# Patient Record
Sex: MALE | Race: OTHER | Employment: OTHER | URBAN - METROPOLITAN AREA
[De-identification: names, ages, dates, MRNs, and addresses within clinical notes are randomized per-mention and may not be internally consistent; named-entity substitution may affect disease eponyms.]

---

## 2017-03-09 NOTE — PATIENT DISCUSSION
(O42.134) Keratoconjunct sicca, not specified as Sjogren's, bilateral - Assesment : Examination revealed Dry Eye Syndrome OU. - Plan : Monitor for changes. Advised patient to call our office with decreased vision or increased symptoms.

## 2017-03-09 NOTE — PATIENT DISCUSSION
(F43.616) Vitreous degeneration, bilateral - Assesment : Examination revealed PVD OU. - Plan : Monitor for changes. Advised patient to call our office with decreased vision or an increase in flashes and/or floaters.

## 2017-03-09 NOTE — PATIENT DISCUSSION
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 MAC OCT  OD - Drusen and atrophic changes/OS - RPE changes - Stable OU. - Plan : Monitor for changes. Advised patient to call our office with decreased vision or increased distortion. Monitor amsler grid and continue AREDs 2 vitamins.  RV 1 year Exam/OCTM

## 2017-03-09 NOTE — PATIENT DISCUSSION
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 Restor OU. - Plan : Monitor for changes. Advised patient to call our office with decreased vision or increased symptoms.

## 2018-03-05 NOTE — PATIENT DISCUSSION
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 MAC OCT  OU - pigment clumping with drusen - Stable OU. - Plan : Monitor for changes. Advised patient to call our office with decreased vision or increased distortion. Monitor amsler grid and continue AREDs 2 vitamins. RV 1 year Exam/MAC OCT.

## 2018-03-05 NOTE — PATIENT DISCUSSION
(M66.000) Keratoconjunct sicca, not specified as Sjogren's, bilateral - Assesment : Examination revealed Dry Eye Syndrome OU. - Plan : Recommend artificial tears 3-4 times daily OU. Monitor for changes. Advised patient to call our office with decreased vision or increased symptoms.

## 2018-03-05 NOTE — PATIENT DISCUSSION
(H26.001) Other secondary cataract, right eye - Assesment : Posterior capsule opacification present. - Plan : Monitor for Changes. Advised patient to call our office with decreased vision or increased symptoms.

## 2018-03-05 NOTE — PATIENT DISCUSSION
(W09.799) Vitreous degeneration, left eye - Assesment : Examination revealed PVD OS. - Plan : Monitor for changes. Advised patient to call our office with decreased vision or an increase in flashes and/or floaters.

## 2019-03-05 NOTE — PATIENT DISCUSSION
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 PED present with fluid. ? Wet ARMD - Plan : refer to retina for evaluation for ?wet ARMD.

## 2019-03-05 NOTE — PATIENT DISCUSSION
(Z25.3880) Nonexudative age-related macular degeneration right eye inte - Assesment : Examination revealed AMD Dry. No edema or hemorrhage seen today - Plan : Monitor for changes. Advised patient to call our office with decreased vision or increased distortion.  1 year exam with DAYANA

## 2019-03-05 NOTE — PATIENT DISCUSSION
(J38.028) Vitreous degeneration, bilateral - Assesment : Examination revealed PVD OU. - Plan : Monitor for changes. Advised patient to call our office with decreased vision or an increase in flashes and/or floaters.

## 2019-03-28 ENCOUNTER — ESTABLISHED COMPREHENSIVE EXAM (OUTPATIENT)
Dept: URBAN - METROPOLITAN AREA CLINIC 39 | Facility: CLINIC | Age: 79
End: 2019-03-28

## 2019-03-28 DIAGNOSIS — H04.123: ICD-10-CM

## 2019-03-28 DIAGNOSIS — H26.492: ICD-10-CM

## 2019-03-28 DIAGNOSIS — H35.371: ICD-10-CM

## 2019-03-28 DIAGNOSIS — H40.013: ICD-10-CM

## 2019-03-28 DIAGNOSIS — H25.811: ICD-10-CM

## 2019-03-28 DIAGNOSIS — H43.813: ICD-10-CM

## 2019-03-28 PROCEDURE — 92015 DETERMINE REFRACTIVE STATE: CPT

## 2019-03-28 PROCEDURE — 92134 CPTRZ OPH DX IMG PST SGM RTA: CPT

## 2019-03-28 PROCEDURE — 92014 COMPRE OPH EXAM EST PT 1/>: CPT

## 2019-03-28 ASSESSMENT — VISUAL ACUITY
OS_SC: 20/400
OD_SC: J5
OS_SC: 20/80
OU_SC: 20/50
OD_SC: 20/70-2+2
OU_SC: J5

## 2019-03-28 ASSESSMENT — TONOMETRY
OS_IOP_MMHG: 16
OD_IOP_MMHG: 18

## 2019-04-02 ENCOUNTER — CATARACT CONSULT (OUTPATIENT)
Dept: URBAN - METROPOLITAN AREA CLINIC 39 | Facility: CLINIC | Age: 79
End: 2019-04-02

## 2019-04-02 VITALS — HEART RATE: 65 BPM | HEIGHT: 60 IN | DIASTOLIC BLOOD PRESSURE: 77 MMHG | SYSTOLIC BLOOD PRESSURE: 142 MMHG

## 2019-04-02 DIAGNOSIS — H43.813: ICD-10-CM

## 2019-04-02 DIAGNOSIS — H35.371: ICD-10-CM

## 2019-04-02 DIAGNOSIS — H04.123: ICD-10-CM

## 2019-04-02 DIAGNOSIS — H40.013: ICD-10-CM

## 2019-04-02 DIAGNOSIS — Z79.899: ICD-10-CM

## 2019-04-02 DIAGNOSIS — H25.811: ICD-10-CM

## 2019-04-02 DIAGNOSIS — Z96.1: ICD-10-CM

## 2019-04-02 DIAGNOSIS — H26.492: ICD-10-CM

## 2019-04-02 PROCEDURE — V2799I IMPRIMIS

## 2019-04-02 PROCEDURE — 92025-3 CORNEAL TOPO, REFUSED

## 2019-04-02 PROCEDURE — 92136TC INTERFEROMETRY - TECHNICAL COMPONENT

## 2019-04-02 PROCEDURE — 92014 COMPRE OPH EXAM EST PT 1/>: CPT

## 2019-04-02 PROCEDURE — 92134 CPTRZ OPH DX IMG PST SGM RTA: CPT

## 2019-04-02 PROCEDURE — 92015 DETERMINE REFRACTIVE STATE: CPT

## 2019-04-02 ASSESSMENT — VISUAL ACUITY
OU_SC: J5
OS_SC: J8
OU_SC: 20/70
OD_SC: 20/70
OS_SC: 20/80
OD_SC: J5
OS_CC: J1
OU_CC: J1
OD_CC: J1

## 2019-04-02 ASSESSMENT — TONOMETRY
OD_IOP_MMHG: 20
OS_IOP_MMHG: 18

## 2019-04-10 ENCOUNTER — PRE-OP/H&P (OUTPATIENT)
Dept: URBAN - METROPOLITAN AREA SURGERY 14 | Facility: SURGERY | Age: 79
End: 2019-04-10

## 2019-04-10 ENCOUNTER — SURGERY/PROCEDURE (OUTPATIENT)
Dept: URBAN - METROPOLITAN AREA CLINIC 39 | Facility: CLINIC | Age: 79
End: 2019-04-10

## 2019-04-10 DIAGNOSIS — H26.492: ICD-10-CM

## 2019-04-10 DIAGNOSIS — H25.811: ICD-10-CM

## 2019-04-10 DIAGNOSIS — Z79.899: ICD-10-CM

## 2019-04-10 DIAGNOSIS — H35.371: ICD-10-CM

## 2019-04-10 DIAGNOSIS — H40.013: ICD-10-CM

## 2019-04-10 DIAGNOSIS — H04.123: ICD-10-CM

## 2019-04-10 DIAGNOSIS — H43.813: ICD-10-CM

## 2019-04-10 PROCEDURE — 99499 UNLISTED E&M SERVICE: CPT

## 2019-04-10 PROCEDURE — 66984 XCAPSL CTRC RMVL W/O ECP: CPT

## 2019-04-11 ENCOUNTER — 1 DAY POST-OP (OUTPATIENT)
Dept: URBAN - METROPOLITAN AREA CLINIC 39 | Facility: CLINIC | Age: 79
End: 2019-04-11

## 2019-04-11 DIAGNOSIS — Z96.1: ICD-10-CM

## 2019-04-11 RX ORDER — BRIMONIDINE TARTRATE, TIMOLOL MALEATE 2; 5 MG/ML; MG/ML
1 SOLUTION/ DROPS OPHTHALMIC TWICE A DAY
Start: 2019-04-11 | End: 2019-04-16

## 2019-04-11 ASSESSMENT — TONOMETRY
OD_IOP_MMHG: 36
OS_IOP_MMHG: 18
OD_IOP_MMHG: 22

## 2019-04-11 ASSESSMENT — VISUAL ACUITY
OD_SC: 20/40+1
OD_PH: 20/20-1
OS_SC: 20/50

## 2019-04-17 ENCOUNTER — SURGERY/PROCEDURE (OUTPATIENT)
Dept: URBAN - METROPOLITAN AREA SURGERY 14 | Facility: SURGERY | Age: 79
End: 2019-04-17

## 2019-04-17 DIAGNOSIS — H26.492: ICD-10-CM

## 2019-04-17 PROCEDURE — 66821 AFTER CATARACT LASER SURGERY: CPT

## 2019-04-26 ENCOUNTER — YAG POST-OP (OUTPATIENT)
Dept: URBAN - METROPOLITAN AREA CLINIC 39 | Facility: CLINIC | Age: 79
End: 2019-04-26

## 2019-04-26 DIAGNOSIS — Z98.890: ICD-10-CM

## 2019-04-26 DIAGNOSIS — Z96.1: ICD-10-CM

## 2019-04-26 PROCEDURE — 99024 POSTOP FOLLOW-UP VISIT: CPT

## 2019-04-26 ASSESSMENT — VISUAL ACUITY
OD_SC: J3
OU_SC: J2
OU_SC: 20/40+2
OS_SC: 20/40-1
OD_SC: 20/40-1
OS_SC: J2

## 2019-04-26 ASSESSMENT — TONOMETRY
OS_IOP_MMHG: 14
OD_IOP_MMHG: 16

## 2019-08-26 ENCOUNTER — ESTABLISHED COMPREHENSIVE EXAM (OUTPATIENT)
Dept: URBAN - METROPOLITAN AREA CLINIC 39 | Facility: CLINIC | Age: 79
End: 2019-08-26

## 2019-08-26 DIAGNOSIS — H04.123: ICD-10-CM

## 2019-08-26 DIAGNOSIS — H35.373: ICD-10-CM

## 2019-08-26 DIAGNOSIS — H40.013: ICD-10-CM

## 2019-08-26 DIAGNOSIS — H26.491: ICD-10-CM

## 2019-08-26 DIAGNOSIS — H43.813: ICD-10-CM

## 2019-08-26 PROCEDURE — 92015 DETERMINE REFRACTIVE STATE: CPT

## 2019-08-26 PROCEDURE — 92014 COMPRE OPH EXAM EST PT 1/>: CPT

## 2019-08-26 PROCEDURE — 92133 CPTRZD OPH DX IMG PST SGM ON: CPT

## 2019-08-26 ASSESSMENT — TONOMETRY
OD_IOP_MMHG: 9
OS_IOP_MMHG: 9

## 2019-08-26 ASSESSMENT — VISUAL ACUITY
OU_SC: 20/20-1
OS_SC: 20/30+2
OD_SC: 20/25-1
OS_CC: J1+
OD_CC: J1+
OU_CC: J1+